# Patient Record
Sex: FEMALE | Race: WHITE | Employment: FULL TIME | URBAN - METROPOLITAN AREA
[De-identification: names, ages, dates, MRNs, and addresses within clinical notes are randomized per-mention and may not be internally consistent; named-entity substitution may affect disease eponyms.]

---
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
## 2020-02-02 ENCOUNTER — HOSPITAL ENCOUNTER (EMERGENCY)
Age: 20
Discharge: HOME | End: 2020-02-02
Attending: EMERGENCY MEDICINE
Payer: MEDICAID

## 2020-02-02 ENCOUNTER — APPOINTMENT (OUTPATIENT)
Dept: GENERAL RADIOLOGY | Age: 20
End: 2020-02-02
Payer: MEDICAID

## 2020-02-02 VITALS
DIASTOLIC BLOOD PRESSURE: 71 MMHG | TEMPERATURE: 98.6 F | RESPIRATION RATE: 16 BRPM | HEART RATE: 76 BPM | HEIGHT: 65 IN | OXYGEN SATURATION: 99 % | SYSTOLIC BLOOD PRESSURE: 127 MMHG | WEIGHT: 155 LBS | BODY MASS INDEX: 25.83 KG/M2

## 2020-02-02 PROCEDURE — 73030 X-RAY EXAM OF SHOULDER: CPT

## 2020-02-02 PROCEDURE — 99283 EMERGENCY DEPT VISIT LOW MDM: CPT

## 2020-02-02 SDOH — HEALTH STABILITY: MENTAL HEALTH: HOW OFTEN DO YOU HAVE A DRINK CONTAINING ALCOHOL?: NEVER

## 2020-02-02 ASSESSMENT — ENCOUNTER SYMPTOMS
VOMITING: 0
EYE DISCHARGE: 0
SORE THROAT: 0
ABDOMINAL PAIN: 0
BACK PAIN: 0
DIARRHEA: 0
COUGH: 0
NAUSEA: 0

## 2020-02-02 ASSESSMENT — PAIN SCALES - GENERAL: PAINLEVEL_OUTOF10: 3

## 2020-02-02 ASSESSMENT — PAIN DESCRIPTION - LOCATION: LOCATION: SHOULDER

## 2020-02-02 ASSESSMENT — PAIN DESCRIPTION - ORIENTATION: ORIENTATION: RIGHT

## 2020-02-02 ASSESSMENT — PAIN DESCRIPTION - PAIN TYPE: TYPE: ACUTE PAIN

## 2020-02-02 NOTE — ED PROVIDER NOTES
[facility]  eMERGENCY dEPARTMENT eNCOUnter        Pt Name: Cliff Boucher  MRN: [id number]  [place] 2000  Date of evaluation: 2/2/2020  Provider: Kofi Souza MD  PCP: No primary care provider on file. ED Attending: Kofi Souza MD    CHIEF COMPLAINT       Chief Complaint   Patient presents with    Shoulder Pain     pt injured right shoulder while being pulled being a sled by a golf cart approx 1400. pain on ant right shoulder. no deformity noted. right arm pink warm +2 radial pulses       HISTORY OF PRESENT ILLNESS   (Location/Symptom, Timing/Onset, Context/Setting, Quality, Duration, Modifying Factors, Severity)  Note limiting factors. Cliff Boucher is a 23 y.o. female who presents to the emergency department complaining of primarily right shoulder pain. Patient at approximately 2 PM today was riding in a sled being towed behind a golf cart when she rolled out of the slide. She rolled several times. She also believes she struck her head at one point but did not lose consciousness. Since the accident occurred the patient has complained of some increasing right shoulder pain that is moderate dull aching pain worse with movement, better with rest.  No associated numbness tingling or weakness. She denies any popping clicking or grinding. She primarily has pain at the posterior aspect of her shoulder as well as the anterior aspect. She denies any nausea, vomiting, blurry vision, double vision, confusion. Her headache is a mild throbbing headache without any exacerbating or alleviating factors. She denies injuries elsewhere. History is obtained from the patient. REVIEW OF SYSTEMS    (2-9 systems for level 4, 10 or more for level 5)     Review of Systems   Constitutional: Negative for chills and fever. HENT: Negative for congestion and sore throat. Eyes: Negative for discharge. Respiratory: Negative for cough.     Cardiovascular: Negative for chest pain.   Gastrointestinal: Negative for abdominal pain, diarrhea, nausea and vomiting. Endocrine: Negative for polydipsia. Genitourinary: Negative for dysuria and urgency. Musculoskeletal: Positive for arthralgias. Negative for back pain, myalgias, neck pain and neck stiffness. Skin: Negative for rash. Neurological: Positive for headaches. Negative for dizziness, speech difficulty, weakness and light-headedness. Hematological: Does not bruise/bleed easily. Psychiatric/Behavioral: Negative for confusion. Positives and Pertinent negatives as per HPI. Except as noted abovein the ROS, all other systems were reviewed and negative. PAST MEDICAL HISTORY   History reviewed. No pertinent past medical history. SURGICAL HISTORY   History reviewed. No pertinent surgical history. CURRENTMEDICATIONS       Previous Medications    No medications on file         ALLERGIES     Patient has no known allergies. FAMILYHISTORY     History reviewed. No pertinent family history.        SOCIAL HISTORY       Social History     Socioeconomic History    Marital status: Single     Spouse name: None    Number of children: None    Years of education: None    Highest education level: None   Occupational History    None   Social Needs    Financial resource strain: None    Food insecurity:     Worry: None     Inability: None    Transportation needs:     Medical: None     Non-medical: None   Tobacco Use    Smoking status: Never Smoker    Smokeless tobacco: Never Used   Substance and Sexual Activity    Alcohol use: Never     Frequency: Never    Drug use: None    Sexual activity: None   Lifestyle    Physical activity:     Days per week: None     Minutes per session: None    Stress: None   Relationships    Social connections:     Talks on phone: None     Gets together: None     Attends Tenriism service: None     Active member of club or organization: None     Attends meetings of clubs or organizations: None     Relationship status: None    Intimate partner violence:     Fear of current or ex partner: None     Emotionally abused: None     Physically abused: None     Forced sexual activity: None   Other Topics Concern    None   Social History Narrative    None       SCREENINGS             PHYSICAL EXAM    (up to 7 for level 4, 8 or more for level 5)     ED Triage Vitals [02/02/20 1830]   BP Temp Temp Source Heart Rate Resp SpO2 Height Weight - Scale   127/71 98.6 °F (37 °C) Oral 76 16 99 % 5' 5\" (1.651 m) 155 lb (70.3 kg)       Physical Exam  Constitutional:       General: She is not in acute distress. Appearance: She is well-developed. HENT:      Head: Normocephalic and atraumatic. Right Ear: External ear normal.      Left Ear: External ear normal.      Nose: Nose normal.      Mouth/Throat:      Pharynx: No oropharyngeal exudate. Eyes:      Conjunctiva/sclera: Conjunctivae normal.   Neck:      Musculoskeletal: Normal range of motion and neck supple. Comments: Cleared using Nexus criteria  Cardiovascular:      Rate and Rhythm: Normal rate and regular rhythm. Heart sounds: Normal heart sounds. No murmur. No friction rub. No gallop. Pulmonary:      Effort: Pulmonary effort is normal. No respiratory distress. Breath sounds: Normal breath sounds. No wheezing. Abdominal:      General: Bowel sounds are normal. There is no distension. Palpations: Abdomen is soft. Tenderness: There is no abdominal tenderness. There is no guarding or rebound. Musculoskeletal: Normal range of motion. Right shoulder: She exhibits tenderness and pain. She exhibits no bony tenderness, no swelling, no effusion, no crepitus, no deformity, no laceration, no spasm and normal pulse. Right elbow: Normal.     Right wrist: Normal.      Cervical back: Normal.        Arms:       Comments: Right shoulder: Range of motion limited in abduction within the painful arc.   Pain on internal and external rotation of the shoulder. Pain with biceps strength testing. Lymphadenopathy:      Cervical: No cervical adenopathy. Skin:     General: Skin is warm and dry. Capillary Refill: Capillary refill takes less than 2 seconds. Findings: No rash. Neurological:      Mental Status: She is alert and oriented to person, place, and time. Gait: Gait normal.         DIAGNOSTIC RESULTS   LABS:    No results found for this visit on 02/02/20. All other labs were within normal range ornot returned as of this dictation. EKG: All EKG's are interpreted by the Emergency Department Physician who either signs or Co-signs this chart in the absence of a cardiologist.  Please see their note for interpretation of EKG. RADIOLOGY:   Non-plain film images such as CT, Ultrasound and MRI are read by the radiologist.Plain radiographic images are visualized and preliminarily interpreted by the  ED Provider with the belowfindings:    Interpretation per the Radiologist below, if available at the time of this note:    XR SHOULDER RIGHT (MIN 2 VIEWS)   Final Result   No fracture visualized. PROCEDURES   Unless otherwise noted below, none     Procedures    CRITICAL CARE TIME   N/A    CONSULTS:  None      EMERGENCY DEPARTMENT COURSE and DIFFERENTIAL DIAGNOSIS/MDM:   Vitals:    Vitals:    02/02/20 1830   BP: 127/71   Pulse: 76   Resp: 16   Temp: 98.6 °F (37 °C)   TempSrc: Oral   SpO2: 99%   Weight: 155 lb (70.3 kg)   Height: 5' 5\" (1.651 m)       Patient was given the following medications:  Medications - No data to display    Patient's shoulder injury clinically appears to be rotator cuff related and possibly biceps tendon related. The biceps tendon feels intact. Imaging does not show any acute abnormality. I am placing her in a sling for comfort. I went over basic range of motion exercises that she can accomplish at home before seeing a primary care physician.   She was referred for a primary care physician. She may benefit from physical therapy if her symptoms are persistent. Patient and family are agreeable with the plan for outpatient follow-up. I estimate there is LOW risk for COMPARTMENT SYNDROME, DEEP VENOUS THROMBOSIS, SEPTIC ARTHRITIS, TENDON OR NEUROVASCULAR INJURY, thus I consider the discharge disposition reasonable. Poornima Iyer and I have discussed the diagnosis and risks, and we agree with discharging home to follow-up with their primary doctor or the referral orthopedist. We also discussed returning to the Emergency Department immediately if new or worsening symptoms occur. We have discussed the symptoms which are most concerning (e.g., changing or worsening pain, numbness, weakness) that necessitate immediate return. The patient understands the importance of follow up and reasons to return. FINAL IMPRESSION      1. Strain of right shoulder, initial encounter    2. Rotator cuff injury, right, initial encounter    3. Injury of head, initial encounter          DISPOSITION/PLAN   DISPOSITION Decision To Discharge 02/02/2020 07:25:46 PM      PATIENT REFERRED TO:  [facility]) Pre-Services  [address] [place] [address]. [facility] Emergency Department  [address] [place] [address],[address]  [phone]  Go to   If symptoms worsen    [facility] Physician Referral Line  Call [phone] to get an appointment with a primary care provider. Call   Call [phone] for scheduling or appointments.       DISCHARGE MEDICATIONS:  New Prescriptions    No medications on file       DISCONTINUED MEDICATIONS:  Discontinued Medications    NAPROXEN (NAPROSYN) 500 MG TABLET    Take 1 tablet by mouth 2 times daily (with meals) for 10 days              (Please note that portions of this note were completed with a voice recognition program.  Efforts were [facility] edit the dictations but occasionally words are mis-transcribed.)    Angela Chung MD(electronically signed)              Angela Chung,

## 2020-02-03 NOTE — ED NOTES
Instructed to wear sling while up. Motrin every 6 hours. Tylenol as needed. Rest ice elevate. F/u with ortho or pcp if no better in 5-7 days.  Pt v/u     Wiley Gray RN  02/02/20 1950

## 2020-02-12 ENCOUNTER — OFFICE VISIT (OUTPATIENT)
Dept: FAMILY MEDICINE CLINIC | Age: 20
End: 2020-02-12
Payer: MEDICAID

## 2020-02-12 VITALS
DIASTOLIC BLOOD PRESSURE: 84 MMHG | OXYGEN SATURATION: 98 % | WEIGHT: 164 LBS | SYSTOLIC BLOOD PRESSURE: 126 MMHG | RESPIRATION RATE: 20 BRPM | BODY MASS INDEX: 27.29 KG/M2 | HEART RATE: 75 BPM

## 2020-02-12 PROCEDURE — G8427 DOCREV CUR MEDS BY ELIG CLIN: HCPCS | Performed by: NURSE PRACTITIONER

## 2020-02-12 PROCEDURE — G8419 CALC BMI OUT NRM PARAM NOF/U: HCPCS | Performed by: NURSE PRACTITIONER

## 2020-02-12 PROCEDURE — 1036F TOBACCO NON-USER: CPT | Performed by: NURSE PRACTITIONER

## 2020-02-12 PROCEDURE — 99203 OFFICE O/P NEW LOW 30 MIN: CPT | Performed by: NURSE PRACTITIONER

## 2020-02-12 PROCEDURE — G8484 FLU IMMUNIZE NO ADMIN: HCPCS | Performed by: NURSE PRACTITIONER

## 2020-02-12 RX ORDER — DICLOFENAC SODIUM 75 MG/1
75 TABLET, DELAYED RELEASE ORAL 2 TIMES DAILY
Qty: 14 TABLET | Refills: 0 | Status: SHIPPED | OUTPATIENT
Start: 2020-02-12 | End: 2020-02-27 | Stop reason: ALTCHOICE

## 2020-02-12 RX ORDER — LEVONORGESTREL AND ETHINYL ESTRADIOL 0.15-0.03
KIT ORAL
COMMUNITY
Start: 2019-10-28 | End: 2020-02-12

## 2020-02-12 ASSESSMENT — ENCOUNTER SYMPTOMS
COUGH: 0
TROUBLE SWALLOWING: 0
SORE THROAT: 0
ABDOMINAL PAIN: 0
COLOR CHANGE: 0
WHEEZING: 0
NAUSEA: 0
DIARRHEA: 0
CHEST TIGHTNESS: 0
SHORTNESS OF BREATH: 0
EYE REDNESS: 0
CONSTIPATION: 0
EYE ITCHING: 0
SINUS PRESSURE: 0

## 2020-02-12 ASSESSMENT — PATIENT HEALTH QUESTIONNAIRE - PHQ9
1. LITTLE INTEREST OR PLEASURE IN DOING THINGS: 0
SUM OF ALL RESPONSES TO PHQ9 QUESTIONS 1 & 2: 0
SUM OF ALL RESPONSES TO PHQ QUESTIONS 1-9: 0
2. FEELING DOWN, DEPRESSED OR HOPELESS: 0
SUM OF ALL RESPONSES TO PHQ QUESTIONS 1-9: 0

## 2020-02-12 ASSESSMENT — VISUAL ACUITY: OU: 1

## 2020-02-12 NOTE — PROGRESS NOTES
Substance and Sexual Activity    Alcohol use: Never     Frequency: Never    Drug use: Not on file    Sexual activity: Not on file   Lifestyle    Physical activity:     Days per week: Not on file     Minutes per session: Not on file    Stress: Not on file   Relationships    Social connections:     Talks on phone: Not on file     Gets together: Not on file     Attends Anglican service: Not on file     Active member of club or organization: Not on file     Attends meetings of clubs or organizations: Not on file     Relationship status: Not on file    Intimate partner violence:     Fear of current or ex partner: Not on file     Emotionally abused: Not on file     Physically abused: Not on file     Forced sexual activity: Not on file   Other Topics Concern    Not on file   Social History Narrative    Not on file       Family History   Problem Relation Age of Onset    High Blood Pressure Maternal Grandmother     High Blood Pressure Maternal Grandfather     Cancer Paternal Grandmother     High Blood Pressure Paternal Grandmother     Cancer Paternal Grandfather     High Blood Pressure Paternal Grandfather        Current Outpatient Medications   Medication Sig Dispense Refill    diclofenac (VOLTAREN) 75 MG EC tablet Take 1 tablet by mouth 2 times daily for 7 days 14 tablet 0     No current facility-administered medications for this visit. No Known Allergies    No results found for any previous visit. Review of Systems   Constitutional: Negative for activity change, chills, fatigue, fever and unexpected weight change. HENT: Negative for drooling, mouth sores, sinus pressure, sore throat and trouble swallowing. Eyes: Negative for redness, itching and visual disturbance. Respiratory: Negative for cough, chest tightness, shortness of breath and wheezing. Cardiovascular: Negative for chest pain, palpitations and leg swelling.    Gastrointestinal: Negative for abdominal pain, constipation, General: Bowel sounds are normal. There is no distension. Palpations: Abdomen is soft. Musculoskeletal:         General: Tenderness (right shoulder) present. No swelling, deformity or signs of injury. Right shoulder: She exhibits decreased range of motion, tenderness, pain and decreased strength. She exhibits no bony tenderness, no swelling, no crepitus, no spasm and normal pulse. Comments: Positive Job, Positive hocking   Lymphadenopathy:      Cervical: No cervical adenopathy. Skin:     General: Skin is warm and dry. Capillary Refill: Capillary refill takes less than 2 seconds. Neurological:      General: No focal deficit present. Mental Status: She is alert and oriented to person, place, and time. Sensory: No sensory deficit. Coordination: Coordination normal.      Deep Tendon Reflexes: Reflexes normal.   Psychiatric:         Attention and Perception: Attention and perception normal.         Mood and Affect: Mood normal.         Speech: Speech normal.         Behavior: Behavior normal. Behavior is cooperative. Thought Content: Thought content normal.         Cognition and Memory: Cognition and memory normal.         Judgment: Judgment normal.         Diagnosis    ICD-10-CM    1. Encounter for medical examination to establish care Z00.00    2.  Acute pain of right shoulder M25.511 [facility] Physical Therapy [facility]       Assessment andPlan  Start diclofenac for 7 days  Schedule physical therapy appointment  Ice shoulder TID  Do home exercises- avoid those that increase pain  No changes to work at this time as she is currently on light duty- report if fails to improve    Orders Placed This Encounter   Procedures   [address]     Referral Priority:   Urgent     Referral Type:   Eval and Treat     Referral Reason:   Specialty Services Required     Requested Specialty:   Physical Therapy     Number of Visits Requested:   1       Orders Placed This Encounter   Medications    diclofenac (VOLTAREN) 75 MG EC tablet     Sig: Take 1 tablet by mouth 2 times daily for 7 days     Dispense:  14 tablet     Refill:  0       Return in about 2 weeks (around 2/26/2020) for joint/back pain follow-up.

## 2020-02-12 NOTE — PATIENT INSTRUCTIONS
Start diclofenac  Schedule PT appointment  Ice as needed     Patient Education        Shoulder Instability: Exercises  Introduction  Here are some examples of exercises for you to try. The exercises may be suggested for a condition or for rehabilitation. Start each exercise slowly. Ease off the exercises if you start to have pain. You will be told when to start these exercises and which ones will work best for you. How to do the exercises  Shoulder flexion (lying down)   1. Lie on your back, holding a wand with both hands. Your palms should face down as you hold the wand. 2. Keeping your elbows straight, slowly raise your arms over your head. Raise them until you feel a stretch in your shoulders, upper back, and chest.  3. Hold for 15 to 30 seconds. 4. Repeat 2 to 4 times. Shoulder blade squeeze   1. Stand with your arms at your sides, and squeeze your shoulder blades together. Do not raise your shoulders up as you squeeze. 2. Hold for 6 seconds. 3. Repeat 8 to 12 times. Resisted rows   1. Put the band around a solid object at about waist level. (A bedpost will work well.) Each hand should hold an end of the band. 2. With your elbows at your sides and bent to 90 degrees, pull the band back. Your shoulder blades should move toward each other. Return to the starting position. 3. Repeat 8 to 12 times. Internal rotator strengthening exercise   1. Start by tying a piece of elastic exercise material to a doorknob. You can use surgical tubing or Thera-Band. 2. Stand or sit with your shoulder relaxed and your elbow bent 90 degrees. Your upper arm should rest comfortably against your side. Squeeze a rolled towel between your elbow and your body for comfort. This will help keep your arm at your side. 3. Hold one end of the elastic band in the hand of the painful arm. 4. Slowly rotate your forearm toward your body until it touches your belly. Slowly move it back to where you started.   5. Keep your elbow and

## 2020-02-24 ENCOUNTER — HOSPITAL ENCOUNTER (OUTPATIENT)
Dept: PHYSICAL THERAPY | Age: 20
Setting detail: THERAPIES SERIES
Discharge: HOME | End: 2020-02-24
Payer: MEDICAID

## 2020-02-24 PROCEDURE — 97161 PT EVAL LOW COMPLEX 20 MIN: CPT

## 2020-02-24 PROCEDURE — 20560 NDL INSJ W/O NJX 1 OR 2 MUSC: CPT

## 2020-02-24 PROCEDURE — 97140 MANUAL THERAPY 1/> REGIONS: CPT

## 2020-02-24 NOTE — FLOWSHEET NOTE
Outpatient Physical Therapy     [x] Daily Treatment Note   [] Progress Note   [] Discharge Note    Date:  2/24/2020    Patient Name:  Sea Orlando    YOB: 2000    Medical Diagnosis:acute pain R shldr   ICD 10:  M25.511  Treatment Diagnosis:   R shldr strain      Onset Date: 2/2/2020  Referral Date:  2/12/2020  Referring Physician:  JIMBO Nichole CNP     Visits Allowed/Insurance/Certification Information: Mitch Cola  Restrictions/Precautions:    Contraindications to Manipulations: hypermobility  (knees & elbows hyperextension, GHJ laxity)  Red Flag Symptoms: none  Adhesive allergy: no   Latex allergy: no      Pt's Occupation/Job Duties:   at [employer] full time -but will be put in other positions to where she doesn't have to; club rec/indoor soccer 1x/week, gym 5x/wk      Plan of care sent to provider:   []NA   [x]Faxed   []Co-signature     Plan of care signed:    []NA   []Yes   []No      Progress Note covers period from (if applicable):    [x]NA    []From          To         Next Progress Note due:   3/23/2020    Visit# / total visits:  1/12  Plan for Next Session:  DN if needed, STM, strength/stability       Subjective:  See eval     Pain level:   AT EVAL: Patient describes pain to be intermittent, Sharp and aching  Pain location:  Posterior R shldr, sometimes in the triceps, or down the arm to her elbow,   Patient reports pain is  4/10 present  and pain is 8/10 at its worst.  Worsened by  Moving too fast, wrong direction (behind her back, reaching out)  Improved by  Bought copper fit ice pack       Objective:       Exercises in bold performed in department today. Items not bolded are carried forward from prior visits for continuity of the record.   Exercise/Equipment Resistance/Repetitions HEP Other comments       []        []        []        []        []        []        []        []        []          []         []        []        []        []        []        [] limited by other medical complications   []Other:     Goals:   Patient goal for therapy:  \"wants to dance again\"    Progress towards goals:  Goals established on 2/24/2020  Short Term Goals:    3 weeks Long Term Goals:   6  weeks   1). Establish HEP 1). Pt independent with HEP   2). Pain  2/10 or less 2). Pain  0/10 or less   3). Pt with full ROM pn free. 3). Pt with full strength, on free. 4). 4).   5). 5).   6). 6).        Prognosis: [x]Good   []Fair   []Poor  Patient Requires Follow-up:  [x]Yes  []No  Plan: [x]Plan of care initiated     []Continue per plan of care    [] Alter current plan (see comments)    []Hold pending MD visit []Discharge    Timed Code Treatment Minutes:  30  Total Treatment Minutes:  60    Medicare Cap total YTD:   Workers Comp Time Stamp  [x]N/A       Electronically signed by:  Marielle Nunez,  PT, DPT, ATC [id number]

## 2020-02-24 NOTE — PLAN OF CARE
[facility] hospital    The following patient has been evaluated for physical therapy services. In order for therapy to continue, Medicare requires physician review of the treatment plan. Please review the attached evaluation and/or summary of the patient's plan of care, and verify that you agree by signing below and sending it back to our office. Thank you for this referral.    Physician signature_______________________ Date________________    Fax to:   [id number] [phone]              [facility] EVALUATION      Evaluation Date: 2/24/2020       Patient Name: Adi Chaudhari  YOB: 2000      Medical Diagnosis:acute pain R shldr   ICD 10:  M25.511  Treatment Diagnosis:   R shldr strain      Onset Date: 2/2/2020  Referral Date:  2/12/2020  Referring Physician:  JIMBO Butler CNP     Visits Allowed/Insurance/Certification Information: [employer]      Restrictions/Precautions:    Contraindications to Manipulations:   hypermobility  (knees & elbows hyperextension, GHJ laxity)  Red Flag Symptoms:   none  Adhesive allergy: no   Latex allergy: no     Pt's Occupation/Job Duties:  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 Ryan Gage off the sled and rolled onto her R shldr. Pt did not hear a pop or crack. Pt went to ER and was given a sling which she feels helped for the first week, but it got annoying so she took it off. X-ray negative. Pt states that the pain has overall gotten a little better but moving it wrong causes intense pain.  Pt state she has R clavicular pn, but believes b/c she is

## 2020-02-26 ENCOUNTER — HOSPITAL ENCOUNTER (OUTPATIENT)
Dept: PHYSICAL THERAPY | Age: 20
Setting detail: THERAPIES SERIES
Discharge: HOME | End: 2020-02-26
Payer: MEDICAID

## 2020-02-26 PROCEDURE — 97110 THERAPEUTIC EXERCISES: CPT

## 2020-02-26 PROCEDURE — 20560 NDL INSJ W/O NJX 1 OR 2 MUSC: CPT

## 2020-02-26 PROCEDURE — 97140 MANUAL THERAPY 1/> REGIONS: CPT

## 2020-02-26 NOTE — FLOWSHEET NOTE
Outpatient Physical Therapy     [x] Daily Treatment Note   [] Progress Note   [] Discharge Note    Date:  2/26/2020    Patient Name:  Dave Cedeno    YOB: 2000    Medical Diagnosis:acute pain R shldr   ICD 10:  M25.511  Treatment Diagnosis:   R shldr strain      Onset Date: 2/2/2020  Referral Date:  2/12/2020  Referring Physician:  JIMBO Lyman CNP     Visits Allowed/Insurance/Certification Information: [employer] James  Restrictions/Precautions:    Contraindications to Manipulations: hypermobility  (knees & elbows hyperextension, GHJ laxity)  Red Flag Symptoms: none  Adhesive allergy: no   Latex allergy: no      Pt's Occupation/Job Duties:   at [employer] full time -but will be put in other positions to where she doesn't have to; club rec/indoor soccer 1x/week, gym 5x/wk      Plan of care sent to provider:   []NA   [x]Faxed   []Co-signature     Plan of care signed:    []NA   []Yes   []No      Progress Note covers period from (if applicable):    [x]NA    []From          To         Next Progress Note due:   3/23/2020    Visit# / total visits:  2/12  Plan for Next Session:  DN if needed, STM, strength/stability       Subjective:  Pt was able to work without pain for most of her shift. Still has pain with laying on that R side. Pt inquiring about lifting weights. Pain level:   AT EVAL: Patient describes pain to be intermittent, Sharp and aching  Pain location:  Posterior R shldr, sometimes in the triceps, or down the arm to her elbow,   Patient reports pain is  4/10 present  and pain is 8/10 at its worst.  Worsened by  Moving too fast, wrong direction (behind her back, reaching out)  Improved by  Bought copper fit ice pack       Objective:   RUE ROM WNL, sub scap 4/5 with some anterior shldr pn     Exercises in bold performed in department today. Items not bolded are carried forward from prior visits for continuity of the record.   Exercise/Equipment Resistance/Repetitions HEP Other comments       []      Seated BUE ER  x10 [x] Cuing for scapular position     Seated BUE HAB  x10 [x] Cuing for scapular posistion, \"feels it in tricep\"       []        []        []        []        []        []          []         []        []        []        []        []        []        []        []      Therapeutic Exercise/Home Exercise Program:   15 minutes (1u)  See above. Manual Therapy:  15 minutes (1u) + DN 10 minutes (1u)  GrIII-IV PA tspine grossly R>L  Scapular mobilization     TrP DN R lat dorsi, R teres mm. Consent signed 2/24/2020 and precautions addressed. See media tab. **The above techniques were used to restore functional range of motion, reduce muscle spasm, and induce healing in the corresponding musculature by means of intramuscular mobilization. Clean Technique was utilized today while applying the Dry needling treatment. The treatment sites where cleaned with 70% solution of isopropyl alcohol. The PT washed their hands and utilized treatment gloves along with hand  prior to inserting the needles. All needles where removed and discarded in the appropriate sharps container. **  ** Educated patient on anatomy, trigger point etiology, expectations for TDN (bruising, soreness, etc), outcomes, and recommendations for exercise. **    Modalities: 0 minutes    Group Therapy:    0 minutes  Therapeutic Activity:  0 minutes  Gait: 0 minutes  Neuromuscular Re-Education:  0 minutes  Canalith Repositioning Procedure:  0 minutes    Functional Outcome Measure:     Measure Used:           SPADI  Date Assessed:           2/24/2020  Score:                          76/130 (SPADI)  % disability:                 59 %    Assessment/Treatment/Activity Tolerance:  Pt with good response to tx last visit and with decr pn and in strength 4+/5 in R subscapularis. Pt with good tolerance to UE exercises though did display m.  Quivering in R posterior shldr.   Patients response to treatment: [x]Patient tolerated treatment well []Patient limited by fatigue   []Patient limited by pain  []Patient limited by other medical complications   []Other:     Goals:   Patient goal for therapy:  \"wants to dance again\"    Progress towards goals:  Goals established on 2/24/2020  Short Term Goals:    3 weeks Long Term Goals:   6  weeks   1). Establish HEP 1). Pt independent with HEP   2). Pain  2/10 or less 2). Pain  0/10 or less   3). Pt with full ROM pn free. 3). Pt with full strength, on free. 4). 4).   5). 5).   6). 6).        Prognosis: [x]Good   []Fair   []Poor  Patient Requires Follow-up:  [x]Yes  []No  Plan: [x]Plan of care initiated     []Continue per plan of care    [] Alter current plan (see comments)    []Hold pending MD visit []Discharge    Timed Code Treatment Minutes:  30  Total Treatment Minutes:  40    Medicare Cap total YTD:   Workers Comp Time Stamp  [x]N/A       Electronically signed by:  Dilan Blair,  PT, DPT, ATC [id number]

## 2020-02-27 ENCOUNTER — OFFICE VISIT (OUTPATIENT)
Dept: FAMILY MEDICINE CLINIC | Age: 20
End: 2020-02-27
Payer: MEDICAID

## 2020-02-27 VITALS
BODY MASS INDEX: 27.79 KG/M2 | WEIGHT: 167 LBS | HEART RATE: 84 BPM | RESPIRATION RATE: 18 BRPM | SYSTOLIC BLOOD PRESSURE: 110 MMHG | DIASTOLIC BLOOD PRESSURE: 72 MMHG | OXYGEN SATURATION: 98 %

## 2020-02-27 PROCEDURE — 99213 OFFICE O/P EST LOW 20 MIN: CPT | Performed by: NURSE PRACTITIONER

## 2020-02-27 PROCEDURE — G8427 DOCREV CUR MEDS BY ELIG CLIN: HCPCS | Performed by: NURSE PRACTITIONER

## 2020-02-27 PROCEDURE — G8484 FLU IMMUNIZE NO ADMIN: HCPCS | Performed by: NURSE PRACTITIONER

## 2020-02-27 PROCEDURE — 1036F TOBACCO NON-USER: CPT | Performed by: NURSE PRACTITIONER

## 2020-02-27 PROCEDURE — G8419 CALC BMI OUT NRM PARAM NOF/U: HCPCS | Performed by: NURSE PRACTITIONER

## 2020-02-27 RX ORDER — ETONOGESTREL AND ETHINYL ESTRADIOL 11.7; 2.7 MG/1; MG/1
INSERT, EXTENDED RELEASE VAGINAL
COMMUNITY
Start: 2020-02-24 | End: 2021-03-10

## 2020-02-27 ASSESSMENT — ENCOUNTER SYMPTOMS
RESPIRATORY NEGATIVE: 1
GASTROINTESTINAL NEGATIVE: 1

## 2020-03-02 ENCOUNTER — APPOINTMENT (OUTPATIENT)
Dept: PHYSICAL THERAPY | Age: 20
End: 2020-03-02
Payer: COMMERCIAL

## 2020-03-04 ENCOUNTER — HOSPITAL ENCOUNTER (OUTPATIENT)
Dept: PHYSICAL THERAPY | Age: 20
Setting detail: THERAPIES SERIES
Discharge: HOME | End: 2020-03-04
Payer: COMMERCIAL

## 2020-03-04 PROCEDURE — 20560 NDL INSJ W/O NJX 1 OR 2 MUSC: CPT

## 2020-03-04 PROCEDURE — 97032 APPL MODALITY 1+ESTIM EA 15: CPT

## 2020-03-04 NOTE — FLOWSHEET NOTE
Other comments       []      Seated BUE ER  x10 [x] Cuing for scapular position     Seated BUE HAB  x10 [x] Cuing for scapular posistion, \"feels it in tricep\"     IR  [x]     Lat pull down  Mid-range  [x]        []        []        []        []          []         []        []        []        []        []        []        []        []      Therapeutic Exercise/Home Exercise Program:  2 minutes (u)  See above. Reviewed and educated. Okay to stretch but not forceful stretching. Manual Therapy:  13 minutes (1u) + DN 15 minutes (1u)  Scapular mobilization  Acupressure R lat, teres mm, subscap      TrP DN R lat dorsi, R teres mm. Consent signed 2/24/2020 and precautions addressed. See media tab. **The above techniques were used to restore functional range of motion, reduce muscle spasm, and induce healing in the corresponding musculature by means of intramuscular mobilization. Clean Technique was utilized today while applying the Dry needling treatment. The treatment sites where cleaned with 70% solution of isopropyl alcohol. The PT washed their hands and utilized treatment gloves along with hand  prior to inserting the needles. All needles where removed and discarded in the appropriate sharps container. **  ** Educated patient on anatomy, trigger point etiology, expectations for TDN (bruising, soreness, etc), outcomes, and recommendations for exercise. **    Modalities: 0 minutes    Group Therapy:    0 minutes  Therapeutic Activity:  0 minutes  Gait: 0 minutes  Neuromuscular Re-Education:  0 minutes  Canalith Repositioning Procedure:  0 minutes    Functional Outcome Measure:     Measure Used:           SPADI  Date Assessed:           2/24/2020  Score:                          76/130 (SPADI)  % disability:                 59 %    Assessment/Treatment/Activity Tolerance:  Pt with good twitch response this date. Pt educated on HEP, will progress nv.    Patients response to treatment:   [x]Patient tolerated treatment well []Patient limited by fatigue   []Patient limited by pain  []Patient limited by other medical complications   []Other:     Goals:   Patient goal for therapy:  \"wants to dance again\"    Progress towards goals:  Goals established on 2/24/2020  Short Term Goals:    3 weeks Long Term Goals:   6  weeks   1). Establish HEP 1). Pt independent with HEP   2). Pain  2/10 or less 2). Pain  0/10 or less   3). Pt with full ROM pn free. 3). Pt with full strength, on free. 4). 4).   5). 5).   6). 6).        Prognosis: [x]Good   []Fair   []Poor  Patient Requires Follow-up:  [x]Yes  []No  Plan: []Plan of care initiated     [x]Continue per plan of care    [] Alter current plan (see comments)    []Hold pending MD visit []Discharge    Timed Code Treatment Minutes:  15  Total Treatment Minutes:  30    Medicare Cap total YTD:   Workers Comp Time Stamp  [x]N/A       Electronically signed by:  Chandler Ugarte,  PT, DPT, ATC [id number]

## 2020-03-06 ENCOUNTER — HOSPITAL ENCOUNTER (OUTPATIENT)
Dept: PHYSICAL THERAPY | Age: 20
Setting detail: THERAPIES SERIES
Discharge: HOME | End: 2020-03-06
Payer: COMMERCIAL

## 2020-03-06 PROCEDURE — 97110 THERAPEUTIC EXERCISES: CPT

## 2020-03-06 NOTE — FLOWSHEET NOTE
position     Seated BUE HAB  Green x5 [x] Cuing for scapular posistion, \"feels it in tricep\"     IR Green x10 [x]     Lat pull down  Full range green x10  [x] Required cuing for scap position     Serratus punch Green x10  [x]      Serratus plus (high plank) Bench x10 [x]      Lower trap inverted V x10 [x] Required cuing for scap position       []          []         []        []        []        []        []        []        []        []      Therapeutic Exercise/Home Exercise Program:  15 minutes (u)  See above. Pt inquiring about difficulties with pull ups. Manual Therapy:   minutes (u) + DN minutes (u)    Consent signed 2/24/2020 and precautions addressed. See media tab. **The above techniques were used to restore functional range of motion, reduce muscle spasm, and induce healing in the corresponding musculature by means of intramuscular mobilization. Clean Technique was utilized today while applying the Dry needling treatment. The treatment sites where cleaned with 70% solution of isopropyl alcohol. The PT washed their hands and utilized treatment gloves along with hand  prior to inserting the needles. All needles where removed and discarded in the appropriate sharps container. **  ** Educated patient on anatomy, trigger point etiology, expectations for TDN (bruising, soreness, etc), outcomes, and recommendations for exercise. **    Modalities: 0 minutes    Group Therapy:    0 minutes  Therapeutic Activity:  0 minutes  Gait: 0 minutes  Neuromuscular Re-Education:  0 minutes  Canalith Repositioning Procedure:  0 minutes    Functional Outcome Measure:     Measure Used:           SPADI  Date Assessed:           2/24/2020  Score:                          76/130 (SPADI)  % disability:                 59 %    Assessment/Treatment/Activity Tolerance:  Pt with great tolerance to therapeutic exercises this date. Pt would like to come in Monday and we will discuss spreading out her appts. After that.

## 2020-03-09 ENCOUNTER — HOSPITAL ENCOUNTER (OUTPATIENT)
Dept: PHYSICAL THERAPY | Age: 20
Setting detail: THERAPIES SERIES
Discharge: HOME | End: 2020-03-09
Payer: COMMERCIAL

## 2020-03-09 PROCEDURE — 97110 THERAPEUTIC EXERCISES: CPT

## 2020-03-09 NOTE — FLOWSHEET NOTE
scapular position     Seated BUE HAB  Blue x10  [x] Cuing for scapular posistion, \"feels it in tricep\"     IR Black x10 [x]     Lat pull down    Black x10  [x] Required cuing for scap position     Serratus punch Black x10  [x]      Serratus plus (high plank) Bench x10 [x]      Lower trap inverted V Black x10 [x] Required cuing for scap position     Lat pull down CC 5 plates Q03   [] Required cuing for good form       Open chain straight arm circles fwd  Black x10 CW, CCW []         []        []        []        []        []        []        []        []      Therapeutic Exercise/Home Exercise Program:  22 minutes (2u)  See above. Educated & reviewed form for pulls ups: pt has trouble with elbow flexion portion > shldr strength. Pt educated on elbow hyperextension weakness and limiting range to promote slight bend. Manual Therapy:   minutes (u) + DN minutes (u)    Consent signed 2/24/2020 and precautions addressed. See media tab. **The above techniques were used to restore functional range of motion, reduce muscle spasm, and induce healing in the corresponding musculature by means of intramuscular mobilization. Clean Technique was utilized today while applying the Dry needling treatment. The treatment sites where cleaned with 70% solution of isopropyl alcohol. The PT washed their hands and utilized treatment gloves along with hand  prior to inserting the needles. All needles where removed and discarded in the appropriate sharps container. **  ** Educated patient on anatomy, trigger point etiology, expectations for TDN (bruising, soreness, etc), outcomes, and recommendations for exercise.  **    Modalities: 0 minutes  Group Therapy:    0 minutes    Therapeutic Activity: 3 minutes  Wiping down a table, wiping down a table over bands (resistance)  Educated on form and and ergonomics with use of trunk/core vs. Just arm/shldr.     Gait: 0 minutes  Neuromuscular Re-Education:  0 minutes  Rayne Repositioning Procedure:  0 minutes    Functional Outcome Measure:     Measure Used:           SPADI  Date Assessed:           2/24/2020  Score:                          76/130 (SPADI)  % disability:                 59 %    Assessment/Treatment/Activity Tolerance:  Pt with great tolerance to therapeutic exercises this with some soreness afterward. Pt feels comfortable extending time for HEP and coming back in on Monday. Patients response to treatment:   [x]Patient tolerated treatment well []Patient limited by fatigue   []Patient limited by pain  []Patient limited by other medical complications   []Other:     Goals:   Patient goal for therapy:  \"wants to dance again\"    Progress towards goals:  Goals established on 2/24/2020  Short Term Goals:    3 weeks Long Term Goals:   6  weeks   1). Establish HEP MET 1). Pt independent with HEP MET   2). Pain  2/10 or less MET 2). Pain  0/10 or less MET   3). Pt with full ROM pn free. MET 3). Pt with full strength, on free. 4). 4).   5). 5).   6). 6).        Prognosis: [x]Good   []Fair   []Poor  Patient Requires Follow-up:  [x]Yes  []No  Plan: []Plan of care initiated     [x]Continue per plan of care    [] Alter current plan (see comments)    []Hold pending MD visit []Discharge    Timed Code Treatment Minutes:  30  Total Treatment Minutes:  30    Medicare Cap total YTD:   Workers Comp Time Stamp  [x]N/A       Electronically signed by:  Trina Galvin,  PT, DPT, ATC [id number]

## 2020-03-10 ENCOUNTER — HOSPITAL ENCOUNTER (EMERGENCY)
Age: 20
Discharge: HOME | End: 2020-03-10
Attending: EMERGENCY MEDICINE
Payer: COMMERCIAL

## 2020-03-10 VITALS
OXYGEN SATURATION: 99 % | HEART RATE: 87 BPM | WEIGHT: 155 LBS | SYSTOLIC BLOOD PRESSURE: 136 MMHG | HEIGHT: 65 IN | RESPIRATION RATE: 16 BRPM | DIASTOLIC BLOOD PRESSURE: 84 MMHG | TEMPERATURE: 99.1 F | BODY MASS INDEX: 25.83 KG/M2

## 2020-03-10 PROCEDURE — 99282 EMERGENCY DEPT VISIT SF MDM: CPT

## 2020-03-10 PROCEDURE — 6370000000 HC RX 637 (ALT 250 FOR IP): Performed by: EMERGENCY MEDICINE

## 2020-03-10 RX ORDER — TRAMADOL HYDROCHLORIDE 50 MG/1
50 TABLET ORAL ONCE
Status: COMPLETED | OUTPATIENT
Start: 2020-03-10 | End: 2020-03-10

## 2020-03-10 RX ORDER — TRAMADOL HYDROCHLORIDE 50 MG/1
50 TABLET ORAL EVERY 6 HOURS PRN
Qty: 12 TABLET | Refills: 0 | Status: SHIPPED | OUTPATIENT
Start: 2020-03-10 | End: 2020-03-20

## 2020-03-10 RX ORDER — NAPROXEN 500 MG/1
500 TABLET ORAL 2 TIMES DAILY PRN
Qty: 20 TABLET | Refills: 0 | Status: SHIPPED | OUTPATIENT
Start: 2020-03-10 | End: 2020-07-02 | Stop reason: ALTCHOICE

## 2020-03-10 RX ORDER — NAPROXEN 250 MG/1
500 TABLET ORAL ONCE
Status: COMPLETED | OUTPATIENT
Start: 2020-03-10 | End: 2020-03-10

## 2020-03-10 RX ADMIN — TRAMADOL HYDROCHLORIDE 50 MG: 50 TABLET, FILM COATED ORAL at 01:07

## 2020-03-10 RX ADMIN — NAPROXEN 500 MG: 250 TABLET ORAL at 01:07

## 2020-03-10 ASSESSMENT — PAIN DESCRIPTION - PAIN TYPE: TYPE: ACUTE PAIN

## 2020-03-10 ASSESSMENT — PAIN SCALES - GENERAL: PAINLEVEL_OUTOF10: 9

## 2020-03-10 ASSESSMENT — PAIN DESCRIPTION - LOCATION: LOCATION: TEETH

## 2020-03-10 ASSESSMENT — PAIN DESCRIPTION - DESCRIPTORS: DESCRIPTORS: SHARP;THROBBING

## 2020-03-10 NOTE — ED PROVIDER NOTES
Cooperative. No acute distress  HEAD: Normocephalic. Atraumatic. EYES: PERRL. EOM's grossly intact. No conjunctival injection  ENT: Mucous membranes are moist.  Extraction site of all 4 wisdom teeth appear to be well-healed, no erythema/edema/fluctuance or evidence of abscess, no open sockets concerning for dry socket, other teeth are slightly tender to touch, but none are loose, no visible dental caries or fractured teeth, airway is patent, midline uvula, no exudates  NECK: Supple. No rigidity, normal range of motion, no edema, no erythema  HEART: RRR. No murmurs  LUNGS: Respirations nonlabored. Lungs are clear to auscultation bilaterally. EXTREMITIES: No peripheral edema. Moves all extremities equally. SKIN: Warm and dry. No acute rashes. NEUROLOGICAL: Alert and oriented. No gross facial drooping. Strength 5/5, sensation intact. Normal speech  PSYCHIATRIC: Normal mood and affect. ED COURSE/MDM  Patient seen and evaluated. Old records reviewed.      27-year-old female with mouth/dental pain, no signs of infectious process, no evidence of abscess, teeth were sensitive, recommended Sensodyne or other sensitivity toothpaste, explained that I did not feel it appropriate to give her more Percocet if her dentist did not want her to have any more than 5 days worth, appears to be healing appropriately, explained that she may need to taper down to something like tramadol, and encouraged using NSAIDs for anti-inflammatory effect, given prescriptions for Naprosyn and tramadol, told her to call her dentist tomorrow and make a follow-up appointment, no current concern for Timothy's angina, airway is patent, no difficulty breathing or swallowing, tolerates p.o. without issues, she had reported not receiving any postop instructions for her wisdom tooth extraction, given this in her paperwork, strict return precautions given, all questions answered, will return if any worsening symptoms or new concerns, see AVS for further discharge information, patient verbalized understanding of plan, felt comfortable going home. Orders Placed This Encounter   Medications    traMADol (ULTRAM) tablet 50 mg    naproxen (NAPROSYN) tablet 500 mg    traMADol (ULTRAM) 50 MG tablet     Sig: Take 1 tablet by mouth every 6 hours as needed for Pain for up to 10 days. Dispense:  12 tablet     Refill:  0    naproxen (NAPROSYN) 500 MG tablet     Sig: Take 1 tablet by mouth 2 times daily as needed for Pain     Dispense:  20 tablet     Refill:  0          Patient was given scripts for the following medications. I counseled patient how to take these medications. Discharge Medication List as of 3/10/2020  1:04 AM      START taking these medications    Details   traMADol (ULTRAM) 50 MG tablet Take 1 tablet by mouth every 6 hours as needed for Pain for up to 10 days. , Disp-12 tablet, R-0Print      naproxen (NAPROSYN) 500 MG tablet Take 1 tablet by mouth 2 times daily as needed for Pain, Disp-20 tablet, R-0Print             CLINICAL IMPRESSION  1. Pain, dental    2. Status post wisdom tooth extraction    3. Tooth sensitivity        Blood pressure 136/84, pulse 87, temperature 99.1 °F (37.3 °C), temperature source Oral, resp. rate 16, height 5' 5\" (1.651 m), weight 155 lb (70.3 kg), last menstrual period 02/13/2020, SpO2 99 %, not currently breastfeeding. DISPOSITION  Adi Chaudhari was discharged to home in stable condition.                    Evita Butcher,   03/10/20 1685

## 2020-03-10 NOTE — DISCHARGE INSTR - COC
Elimination:  Continence:   · Bowel: {YES / AT:51346}  · Bladder: {YES / WT:06572}  Urinary Catheter: {Urinary Catheter:712105890}   Colostomy/Ileostomy/Ileal Conduit: {YES / OK:02529}       Date of Last BM: ***  No intake or output data in the 24 hours ending 03/10/20 0059  No intake/output data recorded.     Safety Concerns:     [facility] Safety Concerns:418881979}    Impairments/Disabilities:      [facility] Impairments/Disabilities:450989007}    Nutrition Therapy:  Current Nutrition Therapy:   [facility] Diet List:916393333}    Routes of Feeding: {CHP DME Other Feedings:677701141}  Liquids: {Slp liquid thickness:45997}  Daily Fluid Restriction: {CHP DME Yes amt example:293810877}  Last Modified Barium Swallow with Video (Video Swallowing Test): {Done Not Done KMKD:973836659}    Treatments at the Time of Hospital Discharge:   Respiratory Treatments: ***  Oxygen Therapy:  {Therapy; copd oxygen:18957}  Ventilator:    { CC Vent BAFH:750474903}    Rehab Therapies: {THERAPEUTIC INTERVENTION:8142563692}  Weight Bearing Status/Restrictions: [facility] Weight Bearin}  Other Medical Equipment (for information only, NOT a DME order):  {EQUIPMENT:490424800}  Other Treatments: ***    Patient's personal belongings (please select all that are sent with patient):  {CHP DME Belongings:648123054}    RN SIGNATURE:  {Esignature:293199462}    CASE MANAGEMENT/SOCIAL WORK SECTION    Inpatient Status Date: ***    Readmission Risk Assessment Score:  Readmission Risk              Risk of Unplanned Readmission:        0           Discharging to Facility/ Agency   · Name:   · Address:  · Phone:  · Fax:    Dialysis Facility (if applicable)   · Name:  · Address:  · Dialysis Schedule:  · Phone:  · Fax:    / signature: {Esignature:276669200}    PHYSICIAN SECTION    Prognosis: {Prognosis:0792826615}    Condition at Discharge: [address] Patient Condition:749239329}    Rehab Potential (if transferring to Rehab): {Prognosis:4464759653}    Recommended Labs or Other Treatments After Discharge: ***    Physician Certification: I certify the above information and transfer of Janet Bonds  is necessary for the continuing treatment of the diagnosis listed and that she requires {Admit to Appropriate Level of Care:18399} for {GREATER/LESS:351995770} 30 days.      Update Admission H&P: {CHP DME Changes in YIIMY:776521246}    PHYSICIAN SIGNATURE:  {Esignature:912289102}

## 2020-03-11 ENCOUNTER — APPOINTMENT (OUTPATIENT)
Dept: PHYSICAL THERAPY | Age: 20
End: 2020-03-11
Payer: COMMERCIAL

## 2020-06-26 ENCOUNTER — NURSE TRIAGE (OUTPATIENT)
Dept: OTHER | Facility: CLINIC | Age: 20
End: 2020-06-26

## 2020-07-01 ENCOUNTER — TELEPHONE (OUTPATIENT)
Dept: FAMILY MEDICINE CLINIC | Age: 20
End: 2020-07-01

## 2020-07-01 NOTE — TELEPHONE ENCOUNTER
E[facility] received a call from:    Name of Caller: Katelynn Cochran    Relationship to patient: Self    Organization name: [employer]    Best contact number: [phone]    Reason for call: Patient was informed to do an Evisit instead of a VV but when doing so it directed her to contact the office for an appointment.  Patient wanted to know if she needs to come in the office for an appointment or try again for another VV

## 2020-07-01 NOTE — TELEPHONE ENCOUNTER
Future Appointments   Date Time Provider Sharon Tripp   7/2/2020  2:00 PM JIMBO Burrows - CNP [facility] [address]

## 2020-07-02 ENCOUNTER — VIRTUAL VISIT (OUTPATIENT)
Dept: FAMILY MEDICINE CLINIC | Age: 20
End: 2020-07-02
Payer: COMMERCIAL

## 2020-07-02 PROBLEM — Z13.31 SCREENING FOR DEPRESSION: Status: ACTIVE | Noted: 2020-07-02

## 2020-07-02 PROBLEM — Z30.09 GENERAL COUNSELING AND ADVICE FOR CONTRACEPTIVE MANAGEMENT: Status: ACTIVE | Noted: 2020-02-24

## 2020-07-02 PROBLEM — Z30.09 ENCOUNTER FOR EDUCATION ABOUT CONTRACEPTIVE USE: Status: ACTIVE | Noted: 2020-07-02

## 2020-07-02 PROBLEM — Z00.00 WELL ADULT HEALTH CHECK: Status: ACTIVE | Noted: 2020-07-02

## 2020-07-02 PROBLEM — Z12.89 SCREENING FOR MALIGNANT NEOPLASM OF OTHER SITES: Status: ACTIVE | Noted: 2020-07-02

## 2020-07-02 PROCEDURE — 1036F TOBACCO NON-USER: CPT | Performed by: NURSE PRACTITIONER

## 2020-07-02 PROCEDURE — 99213 OFFICE O/P EST LOW 20 MIN: CPT | Performed by: NURSE PRACTITIONER

## 2020-07-02 PROCEDURE — G8428 CUR MEDS NOT DOCUMENT: HCPCS | Performed by: NURSE PRACTITIONER

## 2020-07-02 PROCEDURE — G8419 CALC BMI OUT NRM PARAM NOF/U: HCPCS | Performed by: NURSE PRACTITIONER

## 2020-07-02 RX ORDER — ONDANSETRON 4 MG/1
4 TABLET, ORALLY DISINTEGRATING ORAL EVERY 8 HOURS PRN
Qty: 21 TABLET | Refills: 0 | Status: SHIPPED | OUTPATIENT
Start: 2020-07-02 | End: 2021-03-10

## 2020-07-02 RX ORDER — SUMATRIPTAN 50 MG/1
TABLET, FILM COATED ORAL
Qty: 9 TABLET | Refills: 0 | Status: SHIPPED | OUTPATIENT
Start: 2020-07-02 | End: 2021-03-10

## 2020-07-02 ASSESSMENT — ENCOUNTER SYMPTOMS
PHOTOPHOBIA: 1
SHORTNESS OF BREATH: 0
ABDOMINAL PAIN: 0
CHEST TIGHTNESS: 0
WHEEZING: 0
VOMITING: 1
NAUSEA: 1
COUGH: 0

## 2020-07-02 NOTE — PROGRESS NOTES
2020    TELEHEALTH EVALUATION -- Audio/Visual (During [place]-11 public health emergency)    HPI:    Neha Hamilton (:  2000) has requested an audio/video evaluation for the following concern(s):    Chief Complaint   Patient presents with    Headache       Marie Capellan is seen today through virtual visit for discussion of migraines. She has been having migraines since she was a teenager. She started off having migraines primarily before her periods however over the last several year she is now progressed to having anywhere from 2-6 migraines a month. With the migraine she has sensitivity to light and sound. She also has significant nausea and vomiting. She has had to call off work because of the migraines. She drinks plenty of fluids but also does have anywhere from 2-4 caffeinated beverages a day. She would take ibuprofen or Excedrin and neither of these are effective in stopping her migraines. She has never been on an abortive medication nor has she ever been on a daily preventative migraine medication. Review of Systems   Constitutional: Negative for activity change, chills, fatigue and fever. HENT: Negative for congestion. Eyes: Positive for photophobia and visual disturbance. Respiratory: Negative for cough, chest tightness, shortness of breath and wheezing. Cardiovascular: Negative for chest pain, palpitations and leg swelling. Gastrointestinal: Positive for nausea and vomiting. Negative for abdominal pain. Genitourinary: Negative. Musculoskeletal: Negative. Negative for neck pain and neck stiffness. Skin: Negative. Allergic/Immunologic: Negative for environmental allergies. Neurological: Positive for headaches (mild today). Negative for dizziness and weakness. Psychiatric/Behavioral: Negative. Prior to Visit Medications    Medication Sig Taking?  Authorizing Provider   naproxen (NAPROSYN) 500 MG tablet Take 1 tablet by mouth 2 times daily as needed Medications    SUMAtriptan (IMITREX) 50 MG tablet     Sig: One tablet at the onset of migraines. May repeat in 2 hours if needed. No more than 2 tablets in 24 hours     Dispense:  9 tablet     Refill:  0    ondansetron (ZOFRAN-ODT) 4 MG disintegrating tablet     Sig: Take 1 tablet by mouth every 8 hours as needed for Nausea or Vomiting     Dispense:  21 tablet     Refill:  0     No orders of the defined types were placed in this encounter. Return in about 4 weeks (around 7/30/2020) for Migraines. Tandy Rinne is a 23 y.o. female being evaluated by a Virtual Visit (video visit) encounter to address concerns as mentioned above. A caregiver was present when appropriate. Due to this being a TeleHealth encounter (During [place] [address] public health emergency), evaluation of the following organ systems was limited: Vitals/Constitutional/EENT/Resp/CV/GI//MS/Neuro/Skin/Heme-Lymph-Imm. Pursuant to the emergency declaration under the [address] [place] [address] and the [employer] and [employer] Act, this Virtual Visit was conducted with patient's (and/or legal guardian's) consent, to reduce the patient's risk of exposure to COVID-19 and provide necessary medical care. The patient (and/or legal guardian) has also been advised to contact this office for worsening conditions or problems, and seek emergency medical treatment and/or call 911 if deemed necessary. Patient identification was verified at the start of the visit: Yes    Total time spent on this encounter: Not billed by time    Services were provided through a video synchronous discussion virtually to substitute for in-person clinic visit. Patient and provider were located at their individual homes. --JIMBO Contreras CNP on 7/2/2020 at 7:41 AM    An electronic signature was used to authenticate this note.

## 2020-07-13 ENCOUNTER — OFFICE VISIT (OUTPATIENT)
Dept: PRIMARY CARE CLINIC | Age: 20
End: 2020-07-13
Payer: COMMERCIAL

## 2020-07-13 PROCEDURE — G8419 CALC BMI OUT NRM PARAM NOF/U: HCPCS | Performed by: NURSE PRACTITIONER

## 2020-07-13 PROCEDURE — G8428 CUR MEDS NOT DOCUMENT: HCPCS | Performed by: NURSE PRACTITIONER

## 2020-07-13 PROCEDURE — 99211 OFF/OP EST MAY X REQ PHY/QHP: CPT | Performed by: NURSE PRACTITIONER

## 2020-07-13 NOTE — PROGRESS NOTES
Kaylie Hamilton received a viral test for COVID-19. They were educated on isolation and quarantine as appropriate. For any symptoms, they were directed to seek care from their PCP, given contact information to establish with a doctor, directed to an urgent care or the emergency room.

## 2020-07-17 LAB
SARS-COV-2: NOT DETECTED
SOURCE: NORMAL

## 2020-08-01 PROBLEM — Z00.00 WELL ADULT HEALTH CHECK: Status: RESOLVED | Noted: 2020-07-02 | Resolved: 2020-08-01

## 2020-08-01 PROBLEM — Z13.31 SCREENING FOR DEPRESSION: Status: RESOLVED | Noted: 2020-07-02 | Resolved: 2020-08-01

## 2021-03-06 ENCOUNTER — NURSE TRIAGE (OUTPATIENT)
Dept: OTHER | Facility: CLINIC | Age: 21
End: 2021-03-06

## 2021-03-06 NOTE — TELEPHONE ENCOUNTER
Patient called [facility] [employer]. pre-service center [facility])  with red flag complaint. Brief description of triage: Migraine headaches    Triage indicates for patient to be seen in the next 24 hours, walkin/UC. Care advice provided, patient verbalizes understanding; denies any other questions or concerns; instructed to call back for any new or worsening symptoms. Writer provided warm transfer to [facility] for appointment scheduling. Attention Provider: Thank you for allowing me to participate in the care of your patient. The patient was connected to triage in response to information provided to the [facility]. Please do not respond through this encounter as the response is not directed to a shared pool. Reason for Disposition   [1] Sinus pain of forehead AND [2] yellow or green nasal discharge    Answer Assessment - Initial Assessment Questions  1. LOCATION: \"Where does it hurt? \"   \"forehead and sometime felt in the back\"  2. ONSET: \"When did the headache start? \" (Minutes, hours or days)    3/3 Tuesday. 3. PATTERN: \"Does the pain come and go, or has it been constant since it started? \"    \"always in the back of her head\"  4. SEVERITY: \"How bad is the pain? \" and \"What does it keep you from doing? \"  (e.g., Scale 1-10; mild, moderate, or severe)    - MILD (1-3): doesn't interfere with normal activities     - MODERATE (4-7): interferes with normal activities or awakens from sleep     - SEVERE (8-10): excruciating pain, unable to do any normal activities         3/10  5. RECURRENT SYMPTOM: \"Have you ever had headaches before? \" If so, ask: \"When was the last time? \" and \"What happened that time? \"      Yes, feels like she always has a headache, but intensifies prior to menstrual cycle and mid cycle. 6. CAUSE: \"What do you think is causing the headache? \"  unsure  7. MIGRAINE: \"Have you been diagnosed with migraine headaches? \" If so, ask: \"Is this headache similar? \"     No , wants to see a MD.   8. HEAD INJURY: \"Has there been any recent injury to the head? \"     Possible concussion in middle school. 9. OTHER SYMPTOMS: \"Do you have any other symptoms? \" (fever, stiff neck, eye pain, sore throat, cold symptoms)  Nausea, vomiting, anxiety, photosensitive  10. PREGNANCY: \"Is there any chance you are pregnant? \" \"When was your last menstrual period? \"      Denies, LMP - irregular, unsure.     Protocols used: HEADACHE-ADULT-AH

## 2021-03-10 ENCOUNTER — OFFICE VISIT (OUTPATIENT)
Dept: FAMILY MEDICINE CLINIC | Age: 21
End: 2021-03-10
Payer: MEDICAID

## 2021-03-10 VITALS
SYSTOLIC BLOOD PRESSURE: 116 MMHG | OXYGEN SATURATION: 98 % | HEART RATE: 117 BPM | TEMPERATURE: 96.6 F | BODY MASS INDEX: 25.79 KG/M2 | RESPIRATION RATE: 18 BRPM | DIASTOLIC BLOOD PRESSURE: 74 MMHG | WEIGHT: 155 LBS

## 2021-03-10 DIAGNOSIS — G43.711 INTRACTABLE CHRONIC MIGRAINE WITHOUT AURA AND WITH STATUS MIGRAINOSUS: Primary | ICD-10-CM

## 2021-03-10 PROCEDURE — 99214 OFFICE O/P EST MOD 30 MIN: CPT | Performed by: NURSE PRACTITIONER

## 2021-03-10 PROCEDURE — G8484 FLU IMMUNIZE NO ADMIN: HCPCS | Performed by: NURSE PRACTITIONER

## 2021-03-10 PROCEDURE — 1036F TOBACCO NON-USER: CPT | Performed by: NURSE PRACTITIONER

## 2021-03-10 PROCEDURE — G8427 DOCREV CUR MEDS BY ELIG CLIN: HCPCS | Performed by: NURSE PRACTITIONER

## 2021-03-10 PROCEDURE — G8419 CALC BMI OUT NRM PARAM NOF/U: HCPCS | Performed by: NURSE PRACTITIONER

## 2021-03-10 RX ORDER — RIZATRIPTAN BENZOATE 10 MG/1
10 TABLET ORAL PRN
Qty: 9 TABLET | Refills: 0 | Status: SHIPPED | OUTPATIENT
Start: 2021-03-10

## 2021-03-10 RX ORDER — UBROGEPANT 50 MG/1
TABLET ORAL
Qty: 9 TABLET | Refills: 0 | COMMUNITY
Start: 2021-03-10

## 2021-03-10 RX ORDER — AMITRIPTYLINE HYDROCHLORIDE 10 MG/1
10 TABLET, FILM COATED ORAL NIGHTLY
Qty: 30 TABLET | Refills: 0 | Status: SHIPPED | OUTPATIENT
Start: 2021-03-10 | End: 2021-04-01

## 2021-03-10 RX ORDER — PROCHLORPERAZINE MALEATE 5 MG/1
5 TABLET ORAL EVERY 6 HOURS PRN
Qty: 60 TABLET | Refills: 0 | Status: SHIPPED | OUTPATIENT
Start: 2021-03-10

## 2021-03-10 ASSESSMENT — PATIENT HEALTH QUESTIONNAIRE - PHQ9
SUM OF ALL RESPONSES TO PHQ QUESTIONS 1-9: 0
2. FEELING DOWN, DEPRESSED OR HOPELESS: 0

## 2021-03-10 ASSESSMENT — ENCOUNTER SYMPTOMS
PHOTOPHOBIA: 1
RESPIRATORY NEGATIVE: 1
GASTROINTESTINAL NEGATIVE: 1

## 2021-03-10 NOTE — LETTER
[address] [facility]  [address]  Phone: [phone]  Fax: [phone] [facility], APRN - CNP        March 10, 2021     Patient: Pedro Hamilton   YOB: 2000   Date of Visit: 3/10/2021       To Whom it May Concern:    Yassine Arellano was seen in my clinic on 3/10/2021. She was out of work for three days (Wed, Thurs,Friday) last week due to Migraine. She may return to work on 3/10/21. If you have any questions or concerns, please don't hesitate to call.     Sincerely,           JIMBO Resendez CNP

## 2021-03-10 NOTE — PATIENT INSTRUCTIONS
Patient Education        Migraine Headache: Care Instructions  Your Care Instructions     Migraines are painful, throbbing headaches that often start on one side of the head. They may cause nausea and vomiting and make you sensitive to light, sound, or smell. Without treatment, migraines can last from 4 hours to a few days. Medicines can help prevent migraines or stop them after they have started. Your doctor can help you find which ones work best for you. Follow-up care is a key part of your treatment and safety. Be sure to make and go to all appointments, and call your doctor if you are having problems. It's also a good idea to know your test results and keep a list of the medicines you take. How can you care for yourself at home? · Do not drive if you have taken a prescription pain medicine. · Rest in a quiet, dark room until your headache is gone. Close your eyes, and try to relax or go to sleep. Don't watch TV or read. · Put a cold, moist cloth or cold pack on the painful area for 10 to 20 minutes at a time. Put a thin cloth between the cold pack and your skin. · Use a warm, moist towel or a heating pad set on low to relax tight shoulder and neck muscles. · Have someone gently massage your neck and shoulders. · Take your medicines exactly as prescribed. Call your doctor if you think you are having a problem with your medicine. You will get more details on the specific medicines your doctor prescribes. · Don't take medicine for headache pain too often. Talk to your doctor if you are taking medicine more than 2 days a week to stop a headache. Taking too much pain medicine can lead to more headaches. These are called medicine-overuse headaches. To prevent migraines  · Keep a headache diary so you can figure out what triggers your headaches. Avoiding triggers may help you prevent headaches. Record when each headache began, how long it lasted, and what the pain was like.  Write down any other symptoms you had with the headache, such as nausea, flashing lights or dark spots, or sensitivity to bright light or loud noise. Note if the headache occurred near your period. List anything that might have triggered the headache. Triggers may include certain foods (chocolate, cheese, wine) or odors, smoke, bright light, stress, or lack of sleep. · If your doctor has prescribed medicine for your migraines, take it as directed. You may have medicine that you take only when you get a migraine and medicine that you take all the time to help prevent migraines. ? If your doctor has prescribed medicine for when you get a headache, take it at the first sign of a migraine, unless your doctor has given you other instructions. ? If your doctor has prescribed medicine to prevent migraines, take it exactly as prescribed. Call your doctor if you think you are having a problem with your medicine. · Find healthy ways to deal with stress. Migraines are most common during or right after stressful times. Try finding ways to reduce stress like practicing mindfulness or deep breathing exercises. · Get plenty of sleep and exercise. But be careful to not push yourself too hard during exercise. It may trigger a headache. · Eat meals on a regular schedule. Avoid foods and drinks that often trigger migraines. These include chocolate, alcohol (especially red wine and port), aspartame, monosodium glutamate (MSG), and some additives found in foods (such as hot dogs, morse, cold cuts, aged cheeses, and pickled foods). · Limit caffeine. Don't drink too much coffee, tea, or soda. But don't quit caffeine suddenly. That can also give you migraines. · Do not smoke or allow others to smoke around you. If you need help quitting, talk to your doctor about stop-smoking programs and medicines. These can increase your chances of quitting for good.   · If you are taking birth control pills or hormone therapy, talk to your doctor about whether they are triggering your migraines. When should you call for help? Call 911 anytime you think you may need emergency care. For example, call if:    · You have signs of a stroke. These may include:  ? Sudden numbness, paralysis, or weakness in your face, arm, or leg, especially on only one side of your body. ? Sudden vision changes. ? Sudden trouble speaking. ? Sudden confusion or trouble understanding simple statements. ? Sudden problems with walking or balance. ? A sudden, severe headache that is different from past headaches. Call your doctor now or seek immediate medical care if:    · You have new or worse nausea and vomiting.     · You have a new or higher fever.     · Your headache gets much worse. Watch closely for changes in your health, and be sure to contact your doctor if:    · You are not getting better after 2 days (48 hours). Where can you learn more? Go to https://Gloss48.Well Done. org and sign in to your [employer] account. Enter D606 in the [employer] box to learn more about \"Migraine Headache: Care Instructions. \"     If you do not have an account, please click on the \"Sign Up Now\" link. Current as of: November 20, 2019               Content Version: 12.6  © 5963-7657 bulletn., Incorporated. Care instructions adapted under license by [facility] ([facility]). If you have questions about a medical condition or this instruction, always ask your healthcare professional. [address] any warranty or liability for your use of this information. Patient Education        amitriptyline  Pronunciation:  a shilpa TRIP ayan overton  Brand:  Fabio  What is the most important information I should know about amitriptyline? You should not use this medicine if you have recently had a heart attack.   Do not use amitriptyline if you have used an MAO inhibitor in the past 14 days, such as isocarboxazid, linezolid, methylene blue injection, phenelzine, rasagiline, selegiline, or tranylcypromine. Some young people have thoughts about suicide when first taking an antidepressant. Stay alert to changes in your mood or symptoms. Report any new or worsening symptoms to your doctor. What is amitriptyline? Amitriptyline is a tricyclic antidepressant that is used to treat symptoms of depression. Amitriptyline may also be used for purposes not listed in this medication guide. What should I discuss with my healthcare provider before taking amitriptyline? You should not use amitriptyline if you are allergic to it, or:  · if you have recently had a heart attack. Do not use amitriptyline if you have used an MAO inhibitor in the past 14 days. A dangerous drug interaction could occur. MAO inhibitors include isocarboxazid, linezolid, methylene blue injection, phenelzine, rasagiline, selegiline, tranylcypromine, and others. Tell your doctor if you have used an \"SSRI\" antidepressant in the past 5 weeks, such as citalopram, escitalopram, fluoxetine (Prozac), fluvoxamine, paroxetine, sertraline (Zoloft), trazodone, or vilazodone. Tell your doctor if you have ever had:  · bipolar disorder (manic-depression) or schizophrenia;  · mental illness or psychosis;  · liver disease;  · heart disease;  · a heart attack, stroke, or seizures;  · diabetes (amitriptyline may raise or lower blood sugar);  · glaucoma; or  · problems with urination. Some young people have thoughts about suicide when first taking an antidepressant. Your doctor should check your progress at regular visits. Your family or other caregivers should also be alert to changes in your mood or symptoms. Tell your doctor if you are pregnant or breastfeeding. Amitriptyline is not approved for use by anyone younger than 15years old. How should I take amitriptyline? Follow all directions on your prescription label and read all medication guides or instruction sheets. Your doctor may occasionally change your dose.  Use the medicine exactly as directed. It may take up to 4 weeks before your symptoms improve. Keep using the medication as directed and tell your doctor if your symptoms do not improve. If you need surgery, tell your surgeon you currently use this medicine. You may need to stop for a short time. Do not stop using amitriptyline suddenly, or you could have unpleasant withdrawal symptoms. Ask your doctor how to safely stop using amitriptyline. Store at room temperature away from moisture and heat. Keep the bottle tightly closed when not in use. What happens if I miss a dose? Take the medicine as soon as you can, but skip the missed dose if it is almost time for your next dose. Do not take two doses at one time. What happens if I overdose? Seek emergency medical attention or call the Poison Help line at 1-415.220.3299. An overdose of amitriptyline can be fatal.  Overdose symptoms may include irregular heart rhythm, feeling like you might pass out, seizures, or coma. What should I avoid while taking amitriptyline? Do not drink alcohol. Dangerous side effects or death can occur when alcohol is combined with amitriptyline. Avoid driving or hazardous activity until you know how this medicine will affect you. Your reactions could be impaired. Avoid exposure to sunlight or tanning beds. Amitriptyline can make you sunburn more easily. Wear protective clothing and use sunscreen (SPF 30 or higher) when you are outdoors. What are the possible side effects of amitriptyline? Get emergency medical help if you have signs of an allergic reaction: hives; difficulty breathing; swelling of your face, lips, tongue, or throat. Report any new or worsening symptoms to your doctor, such as: mood or behavior changes, anxiety, panic attacks, trouble sleeping, or if you feel impulsive, irritable, agitated, hostile, aggressive, restless, hyperactive (mentally or physically), more depressed, or have thoughts about suicide or hurting yourself.   Call your doctor at once if you have:  · signs of a blood clot --sudden numbness or weakness, problems with vision or speech, swelling or redness in an arm or leg;  · unusual thoughts or behavior;  · a light-headed feeling, like you might pass out;  · chest pain or pressure, pain spreading to your jaw or shoulder, nausea, sweating;  · pounding heartbeats or fluttering in your chest;  · confusion, hallucinations;  · a seizure (convulsions);  · painful or difficult urination;  · severe constipation;  · easy bruising, unusual bleeding; or  · fever, chills, sore throat, mouth sores. Common side effects may include:  · constipation, diarrhea;  · nausea, vomiting, upset stomach;  · mouth pain, unusual taste, black tongue;  · appetite or weight changes;  · urinating less than usual;  · itching or rash;  · breast swelling (in men or women); or  · decreased sex drive, impotence, or difficulty having an orgasm. This is not a complete list of side effects and others may occur. Call your doctor for medical advice about side effects. You may report side effects to FDA at 0-059-FDA-2437. What other drugs will affect amitriptyline? Taking this medicine with other drugs that make you sleepy can worsen this effect. Ask your doctor before taking amitriptyline with a sleeping pill, narcotic pain medicine, muscle relaxer, or medicine for anxiety, depression, or seizures. Sometimes it is not safe to use certain medications at the same time. Some drugs can affect your blood levels of other drugs you take, which may increase side effects or make the medications less effective.   Tell your doctor about all your other medicines, especially:  · other antidepressants;  · medicine to treat depression, anxiety, mood disorders, or mental illness;  · cold or allergy medicine (Benadryl and others);  · medicine to treat Parkinson's disease;  · medicine to treat stomach problems, motion sickness, or irritable bowel syndrome;  · medicine to treat overactive bladder; or  · bronchodilator asthma medication. This list is not complete. Other drugs may affect amitriptyline, including prescription and over-the-counter medicines, vitamins, and herbal products. Not all possible drug interactions are listed here. Where can I get more information? Your pharmacist can provide more information about amitriptyline. Remember, keep this and all other medicines out of the reach of children, never share your medicines with others, and use this medication only for the indication prescribed. Every effort has been made to ensure that the information provided by [address] is accurate, up-to-date, and complete, but no guarantee is made to that effect. Drug information contained herein may be time sensitive. [facility] information has been compiled for use by healthcare practitioners and consumers in the [place] and therefore [employer] does not warrant that uses outside of the [place] are appropriate, unless specifically indicated otherwise. [facility]'s drug information does not endorse drugs, diagnose patients or recommend therapy. [facility] drug information is an informational resource designed to assist licensed healthcare practitioners in caring for their patients and/or to serve consumers viewing this service as a supplement to, and not a substitute for, the expertise, skill, knowledge and judgment of healthcare practitioners. The absence of a warning for a given drug or drug combination in no way should be construed to indicate that the drug or drug combination is safe, effective or appropriate for any given patient. [facility] does not assume any responsibility for any aspect of healthcare administered with the aid of information [facility] provides. The information contained herein is not intended to cover all possible uses, directions, precautions, warnings, drug interactions, allergic reactions, or adverse effects.  If you have questions about the drugs you are taking, check with your doctor, nurse or pharmacist.  Copyright 0498-9516 167  Be: 10.01. Revision date: 6/11/2020. Care instructions adapted under license by [facility] ([facility]). If you have questions about a medical condition or this instruction, always ask your healthcare professional. [address] any warranty or liability for your use of this information. Patient Education        rizatriptan  Pronunciation:  RYE za TRIP tan  Brand:  Maxalt, Maxalt-MLT  What is the most important information I should know about rizatriptan? You should not use this medicine if you have uncontrolled high blood pressure, heart problems, a history of heart attack or stroke, or circulation problems that cause a lack of blood supply within the body. Do not take rizatriptan within 24 hours before or after using another migraine headache medicine. Do not use this medicine if you have used an MAO inhibitor in the past 14 days, such as isocarboxazid, linezolid, methylene blue injection, phenelzine, rasagiline, selegiline, or tranylcypromine. What is rizatriptan? Rizatriptan is a headache medicine that narrows the blood vessels around the brain. Rizatriptan also reduces substances in the body that can trigger headache pain, nausea, sensitivity to light and sound, and other migraine symptoms. Rizatriptan is used to treat migraine headaches. Rizatriptan will only treat  a headache that has already begun. It will not prevent headaches or reduce the number of attacks. Rizatriptan should not be used to treat a common tension headache, or a headache that causes loss of movement on one side of your body. Use this medicine only if your condition has been confirmed by a doctor as migraine headaches. Rizatriptan may also be used for purposes not listed in this medication guide. What should I discuss with my healthcare provider before using rizatriptan?   You should not use rizatriptan if you are allergic to it, or if you have:  · heart problems, or a stroke (including \"mini-stroke\");  · coronary artery disease, angina (chest pain), blood circulation problems, lack of blood supply to the heart;  · circulation problems affecting your legs, arms, stomach, intestines, or kidneys;  · uncontrolled high blood pressure;  · severe liver disease; or  · a headache that seems different from your usual migraine headaches. Do not use rizatriptan if you have used an MAO inhibitor in the past 14 days. A dangerous drug interaction could occur. MAO inhibitors include isocarboxazid, linezolid, methylene blue injection, phenelzine, rasagiline, selegiline, tranylcypromine, and others. Tell your doctor if you have ever had:  · liver or kidney disease;  · heart problems, chest pain;  · shortness of breath; or  · risk factors for coronary artery disease (such as high blood pressure, high cholesterol, diabetes, menopause, smoking, a family history of coronary artery disease, being overweight, or being older than 36 and a man). Be sure your doctor knows if you also take stimulant medicine, opioid medicine, herbal products, or medicine for depression, mental illness, Parkinson's disease, migraine headaches, serious infections, or prevention of nausea and vomiting. These medicines may interact with rizatriptan and cause a serious condition called serotonin syndrome. Rizatriptan disintegrating tablets may contain phenylalanine. Tell your doctor if you have phenylketonuria (PKU). Tell your doctor if you are pregnant or breastfeeding. Rizatriptan is not approved for use by anyone younger than 10years old. How should I use rizatriptan? Take rizatriptan as soon as you notice headache symptoms. Follow all directions on your prescription label and read all medication guides or instruction sheets. Use the medicine exactly as directed.   You may receive your first dose in a hospital or clinic setting to quickly treat any serious side effects. Read and carefully follow any Instructions for Use provided with your medicine. Ask your doctor or pharmacist if you do not understand these instructions. Take the regular tablet whole with a full glass of water. Remove an orally disintegrating tablet from the package only when you are ready to take the medicine. Place the tablet in your mouth and allow it to dissolve, without chewing. Swallow several times as the tablet dissolves. After taking a tablet (for adults): If your headache does not completely go away, or goes away and comes back, take a second tablet 2 hours after the first. If your symptoms have not improved, contact your doctor before taking any more tablets. After taking a tablet (for children ages 10 to 16): If your headache does not completely go away, or goes away and comes back, contact your doctor before taking any more tablets. Never use more than your recommended dose. Overuse of migraine headache medicine can make headaches worse. Tell your doctor if the medicine seems to stop working as well in treating your migraine attacks. Call your doctor if your symptoms do not improve, or if you have more than 4 headaches in one month (30 days). Rizatriptan can raise blood pressure to dangerous levels. Your blood pressure may need to be checked often while you are using this medicine. If you use rizatriptan long-term, your heart function may need to be checked using an electrocardiograph or ECG (sometimes called an EKG). Store at room temperature away from moisture and heat. What happens if I miss a dose? Since rizatriptan is used when needed, it does not have a daily dosing schedule. Call your doctor if your symptoms do not improve after using this medicine. What happens if I overdose? Seek emergency medical attention or call the Poison Help line at 1-999.126.6423. What should I avoid while using rizatriptan?   Do not take rizatriptan within 24 hours before or after using another about all your other medicines, especially any type of antidepressant. Other drugs may affect rizatriptan, including prescription and over-the-counter medicines, vitamins, and herbal products. Tell your doctor about all your current medicines and any medicine you start or stop using. Where can I get more information? Your pharmacist can provide more information about rizatriptan. Remember, keep this and all other medicines out of the reach of children, never share your medicines with others, and use this medication only for the indication prescribed. Every effort has been made to ensure that the information provided by [address] is accurate, up-to-date, and complete, but no guarantee is made to that effect. Drug information contained herein may be time sensitive. [facility] information has been compiled for use by healthcare practitioners and consumers in the [place] and therefore [employer] does not warrant that uses outside of the [place] are appropriate, unless specifically indicated otherwise. [facility]'s drug information does not endorse drugs, diagnose patients or recommend therapy. [facility] drug information is an informational resource designed to assist licensed healthcare practitioners in caring for their patients and/or to serve consumers viewing this service as a supplement to, and not a substitute for, the expertise, skill, knowledge and judgment of healthcare practitioners. The absence of a warning for a given drug or drug combination in no way should be construed to indicate that the drug or drug combination is safe, effective or appropriate for any given patient. [facility] does not assume any responsibility for any aspect of healthcare administered with the aid of information [facility] provides. The information contained herein is not intended to cover all possible uses, directions, precautions, warnings, drug interactions, allergic reactions, or adverse effects.  If you have questions about the drugs you are taking, check with your doctor, nurse or pharmacist.  Copyright 6217-6851 [address]: 12.01. Revision date: 10/9/2019. Care instructions adapted under license by [facility] ([facility]). If you have questions about a medical condition or this instruction, always ask your healthcare professional. [address] any warranty or liability for your use of this information. Patient Education        ubrogepant  Pronunciation:  radha farias  Brand:  Brigid Kenny  What is the most important information I should know about ubrogepant? Tell your doctor about all your current medicines and any you start or stop using. Many drugs can interact, and some drugs should not be used together. What is ubrogepant? Ebony Lacy is used in adults to treat migraine headaches with or without aura. Ebony Lacy will not prevent a migraine headache. Ebony Lacy may also be used for purposes not listed in this medication guide. What should I discuss with my healthcare provider before taking ubrogepant? Many drugs can interact and cause dangerous effects. Some drugs should not be used together with ubrogepant. Your doctor may change your treatment plan if you also use:  · nefazodone;  · an antibiotic --clarithromycin, telithromycin;  · antifungal medicine --itraconazole, ketoconazole; or  · antiviral medicine to treat HIV/AIDS --indinavir, nelfinavir, ritonavir, saquinavir. Tell your doctor if you have ever had:  · liver disease; or  · kidney disease. Tell your doctor if you are pregnant or plan to become pregnant. It is not known whether ubrogepant will harm an unborn baby. However, having migraine headaches during pregnancy may cause complications such as diabetes or eclampsia (dangerously high blood pressure that can lead to medical problems in both mother and baby). The benefit of treating migraines may outweigh any risks to the baby.   It may not be safe to breastfeed while using this medicine. Ask your doctor about any risk. Lucas Edwards is not approved for use by anyone younger than 25years old. How should I take ubrogepant? Follow all directions on your prescription label and read all medication guides or instruction sheets. Use the medicine exactly as directed. You may take ubrogepant with or without food. Avoid grapefruit or grapefruit juice. After taking ubrogepant:  If your headache does not completely go away, or goes away and comes back, you may take a second tablet if it has been at least 2 hours since your first dose. If your symptoms have not improved, contact your doctor before taking any more tablets. You should not take a second tablet within 24 hours if you have consumed a grapefruit product, or if you also take any of the following medications:   · ciprofloxacin;  · cyclosporine;  · fluconazole;  · fluvoxamine; or  · verapamil. Call your doctor if you have more than 8 headaches in one month (30 days). Tell your doctor if this medicine seems to stop working as well in treating your migraine attacks. Store at room temperature away from moisture and heat. What happens if I miss a dose? Since ubrogepant is used when needed, it does not have a daily dosing schedule. Do not take more than 200 milligrams in a 24-hour period. Do not use ubrogepant to treat more than 8 headaches per month. What happens if I overdose? Seek emergency medical attention or call the Poison Help line at 1-419.313.2680. What should I avoid while taking ubrogepant? Grapefruit may interact with ubrogepant and lead to unwanted side effects. You should not take a second ubrogepant tablet within 24 hours after consuming grapefruit or grapefruit juice. What are the possible side effects of ubrogepant? Get emergency medical help if you have signs of an allergic reaction: hives; difficult breathing; swelling of your face, lips, tongue, or throat.   Common side effects may include:  · nausea; or  · drowsiness. This is not a complete list of side effects and others may occur. Call your doctor for medical advice about side effects. You may report side effects to FDA at 8-450-FDA-1655. What other drugs will affect ubrogepant? Sometimes it is not safe to use certain medications at the same time. Some drugs can affect your blood levels of other drugs you take, which may increase side effects or make the medications less effective. Tell your doctor about all your current medicines. Many drugs can affect ubrogepant, especially:  · curcumin (also called turmeric);  · cyclosporine;  · eltrombopag;  · fluconazole;  · fluvoxamine;  · phenytoin;  · Harman's wort;  · an antibiotic --ciprofloxacin, rifampin;  · a barbiturate --butabarbital, phenobarbital, secobarbital; or  · heart or blood pressure medicine --carvedilol, quinidine, verapamil. This list is not complete and many other drugs may affect ubrogepant. This includes prescription and over-the-counter medicines, vitamins, and herbal products. Not all possible drug interactions are listed here. Where can I get more information? Your pharmacist can provide more information about ubrogepant. Remember, keep this and all other medicines out of the reach of children, never share your medicines with others, and use this medication only for the indication prescribed. Every effort has been made to ensure that the information provided by [address] is accurate, up-to-date, and complete, but no guarantee is made to that effect. Drug information contained herein may be time sensitive. [facility] information has been compiled for use by healthcare practitioners and consumers in the [place] and therefore [facility] does not warrant that uses outside of the [place] are appropriate, unless specifically indicated otherwise. [facility]'s drug information does not endorse drugs, diagnose patients or recommend therapy.  [facility]'s drug information is an informational resource designed to assist licensed healthcare practitioners in caring for their patients and/or to serve consumers viewing this service as a supplement to, and not a substitute for, the expertise, skill, knowledge and judgment of healthcare practitioners. The absence of a warning for a given drug or drug combination in no way should be construed to indicate that the drug or drug combination is safe, effective or appropriate for any given patient. [facility] does not assume any responsibility for any aspect of healthcare administered with the aid of information [facility] provides. The information contained herein is not intended to cover all possible uses, directions, precautions, warnings, drug interactions, allergic reactions, or adverse effects. If you have questions about the drugs you are taking, check with your doctor, nurse or pharmacist.  Copyright 8615-0027 [address]. Version: 1.01. Revision date: 2/18/2020. Care instructions adapted under license by [facility] [place] ([facility]). If you have questions about a medical condition or this instruction, always ask your healthcare professional. [place] [address] any warranty or liability for your use of this information.

## 2021-03-10 NOTE — PROGRESS NOTES
3/10/2021    This is a 21 y.o. female   Chief Complaint   Patient presents with    Migraine     pt states when she scheduled the appt, she was getting migraines every 2 weeks. states now they are \"all around\". had to call off 3 days last week due to migraine. states it was on left side and top of head instead of the \"normal front\" stopped taking Imitrex because it \"did not work\". states it made it worse. Nakia Carreon is here for follow-up on migraines. She has been having migraines since she was an adolescent. She has at least one severe migraine a month. This migraine last 3 to 4 days. It is accompanied by photophobia, phonophobia, nausea, vomiting and severe headache. She does not have an aura. She will then have 3-4 other migraine days a month. These tend to be less severe. She took the Imitrex and was not able to tolerate it due to significant side effects of worsening headache, vision changes and palpitations. She also tried Zofran for the nausea but that seemed to make her headache worse as well. She has stopped all of her medication. She has never seen neurology and has never been on a prophylactic medicine medicine. She does admit that over the past month or so her headache has become more consistent and is in different nature than her previous migraines. She does admit that she is under a little bit more stress as she has switched jobs and is now working third shift. However she is happy with this change and is looking to move up in the company. However she has had issues because she has had a call out for her migraines. Patient Active Problem List   Diagnosis    Encounter for education about contraceptive use    General counseling and advice for contraceptive management    Screening for malignant neoplasm of other sites       No current outpatient medications on file. No current facility-administered medications for this visit.         No Known Allergies    There were no Neurology, [facility]        Plan    With recent headache changes in them being more severe we will get a CT of her head  Start Maxalt although I am concerned that this will cause the same side effects so sample of Ubrelvy given to patient to try if Maxalt is not effective. Increase water  Referral to neurology  Trial of amitriptyline for migraine prophylaxis  Discussed stress as a role in her worsening migraines as well as sleep changes related to shift work  Patient does not wish to be on medications long-term but discussed the role of pain control and management at this time with possible stopping at a later date. Patient is agreeable    Orders Placed This Encounter   Procedures    CT HEAD WO CONTRAST     Standing Status:   Future     Standing Expiration Date:   3/10/2022     Order Specific Question:   Reason for exam:     Answer:   worsening daily migraines   Tony Maurice MD, Neurology, [facility]     Referral Priority:   Routine     Referral Type:   Eval and Treat     Referral Reason:   Specialty Services Required     Referred to Provider:   Adrián Espinosa MD     Requested Specialty:   Neurology     Number of Visits Requested:   1       Orders Placed This Encounter   Medications    rizatriptan (MAXALT) 10 MG tablet     Sig: Take 1 tablet by mouth as needed for Migraine May repeat in 2 hours if needed     Dispense:  9 tablet     Refill:  0    Ubrogepant (UBRELVY) 50 MG TABS     Sig: Take one tablet at the onset of migraine. May repeat in 2 hours. No more than 2 doses in 24 hours. Dispense:  9 tablet     Refill:  0    amitriptyline (ELAVIL) 10 MG tablet     Sig: Take 1 tablet by mouth nightly     Dispense:  30 tablet     Refill:  0    prochlorperazine (COMPAZINE) 5 MG tablet     Sig: Take 1 tablet by mouth every 6 hours as needed for Nausea     Dispense:  60 tablet     Refill:  0       Patient Education:  Plan    Return in about 4 weeks (around 4/7/2021) for migarines.

## 2021-09-07 ENCOUNTER — HOSPITAL ENCOUNTER (OUTPATIENT)
Age: 21
Discharge: HOME | End: 2021-09-07
Payer: COMMERCIAL

## 2021-09-07 LAB
A/G RATIO: 1.6 (ref 1.1–2.2)
ALBUMIN SERPL-MCNC: 4.5 G/DL (ref 3.4–5)
ALP BLD-CCNC: 60 U/L (ref 40–129)
ALT SERPL-CCNC: 13 U/L (ref 10–40)
ANION GAP SERPL CALCULATED.3IONS-SCNC: 15 MMOL/L (ref 3–16)
AST SERPL-CCNC: 21 U/L (ref 15–37)
BASOPHILS ABSOLUTE: 0 K/UL (ref 0–0.2)
BASOPHILS RELATIVE PERCENT: 0.4 %
BILIRUB SERPL-MCNC: 0.3 MG/DL (ref 0–1)
BUN BLDV-MCNC: 16 MG/DL (ref 7–20)
C-REACTIVE PROTEIN: <3 MG/L (ref 0–5.1)
CALCIUM SERPL-MCNC: 10 MG/DL (ref 8.3–10.6)
CHLORIDE BLD-SCNC: 104 MMOL/L (ref 99–110)
CO2: 22 MMOL/L (ref 21–32)
CREAT SERPL-MCNC: 0.9 MG/DL (ref 0.6–1.1)
EOSINOPHILS ABSOLUTE: 0.1 K/UL (ref 0–0.6)
EOSINOPHILS RELATIVE PERCENT: 1.8 %
GFR AFRICAN AMERICAN: >60
GFR NON-AFRICAN AMERICAN: >60
GLOBULIN: 2.9 G/DL
GLUCOSE BLD-MCNC: 94 MG/DL (ref 70–99)
HCT VFR BLD CALC: 41.6 % (ref 36–48)
HEMOGLOBIN: 14.1 G/DL (ref 12–16)
LUTEINIZING HORMONE: 9.7 MIU/ML
LYMPHOCYTES ABSOLUTE: 2.2 K/UL (ref 1–5.1)
LYMPHOCYTES RELATIVE PERCENT: 30.2 %
MCH RBC QN AUTO: 30.8 PG (ref 26–34)
MCHC RBC AUTO-ENTMCNC: 33.8 G/DL (ref 31–36)
MCV RBC AUTO: 91 FL (ref 80–100)
MONOCYTES ABSOLUTE: 0.5 K/UL (ref 0–1.3)
MONOCYTES RELATIVE PERCENT: 7.6 %
NEUTROPHILS ABSOLUTE: 4.3 K/UL (ref 1.7–7.7)
NEUTROPHILS RELATIVE PERCENT: 60 %
PDW BLD-RTO: 13.4 % (ref 12.4–15.4)
PLATELET # BLD: 228 K/UL (ref 135–450)
PMV BLD AUTO: 9.4 FL (ref 5–10.5)
POTASSIUM SERPL-SCNC: 4.1 MMOL/L (ref 3.5–5.1)
RBC # BLD: 4.57 M/UL (ref 4–5.2)
SEDIMENTATION RATE, ERYTHROCYTE: 5 MM/HR (ref 0–20)
SODIUM BLD-SCNC: 141 MMOL/L (ref 136–145)
TOTAL PROTEIN: 7.4 G/DL (ref 6.4–8.2)
VITAMIN D 25-HYDROXY: 44.7 NG/ML
WBC # BLD: 7.2 K/UL (ref 4–11)

## 2021-09-07 PROCEDURE — 86812 HLA TYPING A B OR C: CPT

## 2021-09-07 PROCEDURE — 80053 COMPREHEN METABOLIC PANEL: CPT

## 2021-09-07 PROCEDURE — 86140 C-REACTIVE PROTEIN: CPT

## 2021-09-07 PROCEDURE — 36415 COLL VENOUS BLD VENIPUNCTURE: CPT

## 2021-09-07 PROCEDURE — 85025 COMPLETE CBC W/AUTO DIFF WBC: CPT

## 2021-09-07 PROCEDURE — 86038 ANTINUCLEAR ANTIBODIES: CPT

## 2021-09-07 PROCEDURE — 85652 RBC SED RATE AUTOMATED: CPT

## 2021-09-07 PROCEDURE — 82306 VITAMIN D 25 HYDROXY: CPT

## 2021-09-07 PROCEDURE — 83002 ASSAY OF GONADOTROPIN (LH): CPT

## 2021-09-08 LAB — ANTI-NUCLEAR ANTIBODY (ANA): NEGATIVE

## 2021-09-10 LAB — HLA B27: NEGATIVE
